# Patient Record
(demographics unavailable — no encounter records)

---

## 2025-06-09 NOTE — ASSESSMENT
[FreeTextEntry1] : Assessment Pain intensity: 4 out of 10 Pain   Plan: Counselingingrown toenail   I counseled the patient regarding the following:   Nail care: Ingrown toenails can be treated by partial or complete nail avulsion or more conservatively by inserting a small barrier between the nail and the nail fold.   Expectations: An ingrown nail caused by medial or lateral growth of the nail plate into the nail fold.  We discussed possible surgical excision of ingrown toenail we also discussed possible cauterizing the matrix to prevent regrowth.   Plan: Prescription medication management   Custom patient Rx plan: Recommend Motrin or Aleve as needed for pain and inflammation.   Plan: Foot hygiene recommendations were reviewed.  Patient was instructed to wash and thoroughly dry both feet every day, including between the toes.  Recommendations were given on daily foot inspections and instructions to keep feet dry of excess moisture.  Footwear disinfection techniques reviewed, and only clean socks are recommended.   Patient specific notes: We discussed curettage of nail corner packing with cotton.  I also advised soaking twice a day application of over-the-counter Neosporin cream.  Patient understands if area becomes red or painful to call the office immediately. In addition to general treatment for ingrown toenails we decided to perform a surgical procedure on the right hallux nail medially 3 cc of lidocaine was used to anesthetize the area and the paronychia and ingrown nail was excised using sterile technique.  We did not do a matrixectomy we reviewed postop course patient will soak foot daily twice a day follow-up as needed all questions answered and reviewed.  Patient tolerated procedure well.

## 2025-06-09 NOTE — PHYSICAL EXAM
[General Appearance - In No Acute Distress] : in no acute distress [General Appearance - Alert] : alert [General Appearance - Well Nourished] : well nourished [General Appearance - Well Developed] : well developed [General Appearance - Well-Appearing] : healthy appearing [Ankle Swelling (On Exam)] : not present [Varicose Veins Of Lower Extremities] : not present [Delayed in the Right Toes] : capillary refills normal in right toes [Delayed in the Left Toes] : capillary refills normal in the left toes [2+] : left foot dorsalis pedis 2+ [No Joint Swelling] : no joint swelling [FreeTextEntry1] : Patient with ingrown toenails both big toes.  Right hallux medial had an accident and is causing an inflamed paronychia with pain.  Patient has been unable to get into office and has had ingrown nail for approximately 1 month. [] : normal strength/tone [Normal Foot/Ankle] : Both lower extremities were exposed and visualized. Standing exam demonstrates normal foot posture and alignment. Hindfoot exam shows no hindfoot valgus or varus [No Focal Deficits] : no focal deficits [Sensation] : the sensory exam was normal to light touch and pinprick [Motor Exam] : the motor exam was normal [Oriented To Time, Place, And Person] : oriented to person, place, and time [Deep Tendon Reflexes (DTR)] : deep tendon reflexes were 2+ and symmetric [Affect] : the affect was normal [Impaired Insight] : insight and judgment were intact